# Patient Record
Sex: FEMALE | Race: BLACK OR AFRICAN AMERICAN | NOT HISPANIC OR LATINO | Employment: FULL TIME | URBAN - METROPOLITAN AREA
[De-identification: names, ages, dates, MRNs, and addresses within clinical notes are randomized per-mention and may not be internally consistent; named-entity substitution may affect disease eponyms.]

---

## 2018-03-07 ENCOUNTER — TELEPHONE (OUTPATIENT)
Dept: SURGERY | Facility: CLINIC | Age: 23
End: 2018-03-07

## 2018-03-07 NOTE — TELEPHONE ENCOUNTER
Return call to pt. Call went to voice mail. Left message for pt to call back to schedule an appt. Will await call back from pt.

## 2018-03-07 NOTE — TELEPHONE ENCOUNTER
----- Message from Gertrude Alvarez sent at 3/7/2018  3:09 PM CST -----  Regarding: External Referral Appt Request   Jovita Figueroa referring pt to Dr Enriquez for a consult dx family hx of breast cancer on mother's side. I cannot schedule her an appt. Would you please call the pt to assist her in scheduling? Jovita is sending us records that can be retrieved in . Thanks!

## 2018-04-04 ENCOUNTER — OFFICE VISIT (OUTPATIENT)
Dept: SURGERY | Facility: CLINIC | Age: 23
End: 2018-04-04
Payer: COMMERCIAL

## 2018-04-04 VITALS
TEMPERATURE: 98 F | BODY MASS INDEX: 33.64 KG/M2 | DIASTOLIC BLOOD PRESSURE: 86 MMHG | HEART RATE: 61 BPM | SYSTOLIC BLOOD PRESSURE: 133 MMHG | WEIGHT: 235 LBS | HEIGHT: 70 IN

## 2018-04-04 DIAGNOSIS — Z91.89 AT HIGH RISK FOR BREAST CANCER: Primary | ICD-10-CM

## 2018-04-04 PROCEDURE — 99999 PR PBB SHADOW E&M-EST. PATIENT-LVL III: CPT | Mod: PBBFAC,,, | Performed by: SURGERY

## 2018-04-04 PROCEDURE — 99204 OFFICE O/P NEW MOD 45 MIN: CPT | Mod: S$GLB,,, | Performed by: SURGERY

## 2018-04-04 RX ORDER — KETOCONAZOLE 20 MG/ML
SHAMPOO, SUSPENSION TOPICAL
Refills: 1 | COMMUNITY
Start: 2018-03-17 | End: 2020-05-16 | Stop reason: ALTCHOICE

## 2018-04-04 NOTE — LETTER
April 11, 2018      Jovita Figueroa NP  [address]  [place] [facility] [address]           [facility]  [address]  [facility] [address]  Phone: [phone]  Fax: [phone]          Patient: Letty Deluna   MR Number: [id number]   YOB: 1995   Date of Visit: 4/4/2018       Dear Jovita Figueroa:    Thank you for referring Letty Deluna to me for evaluation. Attached you will find relevant portions of my assessment and plan of care.    If you have questions, please do not hesitate to call me. I look forward to following Letty Deluna along with you.    Sincerely,    Heather Ireland MD      Enclosure  CC:  No Recipients    If you would like to receive this communication electronically, please contact [email or web address] or [phone] to request more information on [facility] access.    For providers and/or their staff who would like to refer a patient to [facility], please contact us through our one-stop-shop provider referral line, [facility], at [phone].    If you feel you have received this communication in error or would no longer like to receive these types of communications, please e-mail [email or web address]

## 2018-04-04 NOTE — PROGRESS NOTES
Breast Surgery  [facility]  Department of Surgery      REFERRING PROVIDER: Jovita Figueroa NP  [address] [address]    Chief Complaint: Consult (New Patient Positive Family Hx Breast Cancer)    Subjective:      Patient ID: Letty Deluna is a 22 y.o. female who presents as a referral for strong family history of breast cancer and other additional cancers. Patient is a healthy college student with no current complaints or medical conditions. Does not believe anyone in family has ever had genetic testing.     Patient does not routinely do self breast exams.  Patient does not have noted a change on breast exam.  Patient denies nipple discharge. Patient denies to previous breast biopsy. Patient denies a personal history of breast cancer.    Family History:  Mother- Breast cancer (stage 4 tx with chemotherapy)- Diagnosed in her early 40's. Diet at age 49.   Maternal Grandmother- Breast cancer-Diagnosed in her late 40s early 50s.  Maternal cousin - Breast cancer.  in her early 40s.  Maternal aunt #1- Breast cancer  in her 60s.  Maternal aunt #2 - Ovarian cancer. Diagnosed age 50+  Maternal aunt #3- Leukanemia. Diagnosed age 50+  Maternal uncle- Colon cancer. Diagnosed age 50+    GYN History:  Age of menarche was 13.  Patient is .    No past medical history on file.  No past surgical history on file.  No current outpatient prescriptions on file prior to visit.     No current facility-administered medications on file prior to visit.      Social History     Social History    Marital status: Single     Spouse name: N/A    Number of children: N/A    Years of education: N/A     Occupational History    student      Social History Main Topics    Smoking status: Never Smoker    Smokeless tobacco: Not on file    Alcohol use Yes    Drug use: No    Sexual activity: No     Other Topics Concern    Not on file     Social History Narrative    No narrative on file     No family history on  "file.     Review of Systems   Constitutional: Negative for activity change, chills, fatigue, fever and unexpected weight change.   Respiratory: Negative for chest tightness and shortness of breath.    Cardiovascular: Negative for chest pain and palpitations.   Gastrointestinal: Negative for abdominal distention, abdominal pain, nausea and vomiting.   Skin: Negative for color change, pallor and rash.   Hematological: Negative for adenopathy. Does not bruise/bleed easily.     Objective:   /86 (BP Location: Right arm, Patient Position: Sitting, BP Method: Medium (Automatic))   Pulse 61   Temp 98.1 °F (36.7 °C) (Oral)   Ht 5' 10" (1.778 m)   Wt 106.6 kg (235 lb)   LMP 03/28/2018   BMI 33.72 kg/m²     Physical Exam   Constitutional: She is oriented to person, place, and time. She appears well-developed and well-nourished.   HENT:   Head: Normocephalic and atraumatic.   Neck: Normal range of motion. Neck supple. No thyromegaly present.   Pulmonary/Chest: Effort normal. No respiratory distress. Right breast exhibits no inverted nipple, no mass, no nipple discharge, no skin change and no tenderness. Left breast exhibits no inverted nipple, no mass, no nipple discharge, no skin change and no tenderness.   Abdominal: Soft. She exhibits no distension.   Lymphadenopathy:     She has no cervical adenopathy.   Neurological: She is alert and oriented to person, place, and time.   Skin: Skin is warm and dry.       Radiology review:   No breast imaging  Assessment:       21 yo college student with strong family history of breast cancer.   Plan:     Discussed family history extensively.   We discussed risk models and risk reduction.  Patient is not ready for genetic testing although we discussed pros and cons of being tested at her age.  With the earliest diagnosis being after age 40, I recommended that she begin imaging at age 32.  We discussed self breast exam and abnormalities including masses, discharge, dimpling, " LAD.  We discussed healthy weight including healthy diet and regular exercise decreasing her risk of breast cancer.  ETOH in moderation was recommended if at all.  I instructed to let me know if she notices anything out of the ordinary.  I will continue to see her yearly for CBE. No imaging needed at this time    45 minutes were spent on this encounter, 35 of which was face to face counseling

## 2020-04-02 ENCOUNTER — NURSE TRIAGE (OUTPATIENT)
Dept: ADMINISTRATIVE | Facility: CLINIC | Age: 25
End: 2020-04-02

## 2020-04-02 NOTE — TELEPHONE ENCOUNTER
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  Patient states that she was treated for the flu last week.  Patient states that other night her temp was 102.8 has come down to 98.6 as of today.  Patient states that she is having increased shortness of breath and difficulty breathing with chest pain.  States she can't take a deep breath in.  Patient also reports having chest tightness.  Cough present since last Friday states she had blood in the mucous.  Advised patient to be seen in the ER for difficulty breathing. Patient verbalized understanding. Patient reports that she is able to drive herself.      Reason for Disposition   [1] Difficulty breathing occurs AND [2] within 14 days of COVID-19 EXPOSURE (Close Contact)    Additional Information   Negative: SEVERE difficulty breathing (e.g., struggling for each breath, speak in single words, bluish lips)   Negative: Sounds like a life-threatening emergency to the triager    Protocols used: CORONAVIRUS (COVID-19) EXPOSURE-A-

## 2020-05-16 ENCOUNTER — HOSPITAL ENCOUNTER (EMERGENCY)
Facility: HOSPITAL | Age: 25
Discharge: ANOTHER HOSPITAL | End: 2020-05-17
Attending: EMERGENCY MEDICINE
Payer: COMMERCIAL

## 2020-05-16 DIAGNOSIS — K59.00 CONSTIPATION: ICD-10-CM

## 2020-05-16 DIAGNOSIS — R10.9 ABDOMINAL PAIN, UNSPECIFIED ABDOMINAL LOCATION: ICD-10-CM

## 2020-05-16 DIAGNOSIS — N83.209 CYST OF OVARY, UNSPECIFIED LATERALITY: Primary | ICD-10-CM

## 2020-05-16 LAB
ALBUMIN SERPL-MCNC: 4.1 G/DL (ref 3.3–5.5)
ALBUMIN SERPL-MCNC: 4.2 G/DL (ref 3.3–5.5)
ALP SERPL-CCNC: 46 U/L (ref 42–141)
ALP SERPL-CCNC: 52 U/L (ref 42–141)
B-HCG UR QL: NEGATIVE
BILIRUB SERPL-MCNC: 0.5 MG/DL (ref 0.2–1.6)
BILIRUB SERPL-MCNC: 0.5 MG/DL (ref 0.2–1.6)
BILIRUBIN, POC UA: NEGATIVE
BLOOD, POC UA: ABNORMAL
BUN SERPL-MCNC: 5 MG/DL (ref 7–22)
CALCIUM SERPL-MCNC: 9.8 MG/DL (ref 8–10.3)
CHLORIDE SERPL-SCNC: 105 MMOL/L (ref 98–108)
CLARITY, POC UA: ABNORMAL
COLOR, POC UA: ABNORMAL
CREAT SERPL-MCNC: 1 MG/DL (ref 0.6–1.2)
CTP QC/QA: YES
GLUCOSE SERPL-MCNC: 104 MG/DL (ref 73–118)
GLUCOSE, POC UA: NEGATIVE
KETONES, POC UA: NEGATIVE
LEUKOCYTE EST, POC UA: NEGATIVE
NITRITE, POC UA: NEGATIVE
PH UR STRIP: 7 [PH]
POC ALT (SGPT): 17 U/L (ref 10–47)
POC ALT (SGPT): 18 U/L (ref 10–47)
POC AMYLASE: 64 U/L (ref 14–97)
POC AST (SGOT): 23 U/L (ref 11–38)
POC AST (SGOT): 26 U/L (ref 11–38)
POC GGT: 25 U/L (ref 5–65)
POC TCO2: 27 MMOL/L (ref 18–33)
POTASSIUM BLD-SCNC: 3.7 MMOL/L (ref 3.6–5.1)
PROTEIN, POC UA: NEGATIVE
PROTEIN, POC: 8.2 G/DL (ref 6.4–8.1)
PROTEIN, POC: 8.7 G/DL (ref 6.4–8.1)
SODIUM BLD-SCNC: 140 MMOL/L (ref 128–145)
SPECIFIC GRAVITY, POC UA: 1.01
UROBILINOGEN, POC UA: 0.2 E.U./DL

## 2020-05-16 PROCEDURE — 85025 COMPLETE CBC W/AUTO DIFF WBC: CPT | Mod: ER

## 2020-05-16 PROCEDURE — U0002 COVID-19 LAB TEST NON-CDC: HCPCS

## 2020-05-16 PROCEDURE — 81003 URINALYSIS AUTO W/O SCOPE: CPT | Mod: ER

## 2020-05-16 PROCEDURE — 81025 URINE PREGNANCY TEST: CPT | Mod: ER | Performed by: EMERGENCY MEDICINE

## 2020-05-16 PROCEDURE — 99285 EMERGENCY DEPT VISIT HI MDM: CPT | Mod: 25,ER

## 2020-05-16 PROCEDURE — 80053 COMPREHEN METABOLIC PANEL: CPT | Mod: ER

## 2020-05-16 PROCEDURE — 82150 ASSAY OF AMYLASE: CPT | Mod: ER

## 2020-05-16 RX ORDER — ONDANSETRON 4 MG/1
8 TABLET, FILM COATED ORAL 2 TIMES DAILY
COMMUNITY

## 2020-05-16 RX ORDER — SYRING-NEEDL,DISP,INSUL,0.3 ML 29 G X1/2"
296 SYRINGE, EMPTY DISPOSABLE MISCELLANEOUS ONCE
COMMUNITY

## 2020-05-17 VITALS
DIASTOLIC BLOOD PRESSURE: 97 MMHG | SYSTOLIC BLOOD PRESSURE: 141 MMHG | TEMPERATURE: 99 F | RESPIRATION RATE: 18 BRPM | BODY MASS INDEX: 32.93 KG/M2 | HEIGHT: 70 IN | HEART RATE: 70 BPM | OXYGEN SATURATION: 100 % | WEIGHT: 230 LBS

## 2020-05-17 LAB — SARS-COV-2 RDRP RESP QL NAA+PROBE: NEGATIVE

## 2020-05-17 PROCEDURE — 25000003 PHARM REV CODE 250: Mod: ER | Performed by: EMERGENCY MEDICINE

## 2020-05-17 RX ORDER — ACETAMINOPHEN 650 MG/1
650 SUPPOSITORY RECTAL
Status: COMPLETED | OUTPATIENT
Start: 2020-05-17 | End: 2020-05-17

## 2020-05-17 RX ADMIN — ACETAMINOPHEN 650 MG: 650 SUPPOSITORY RECTAL at 01:05

## 2020-05-17 NOTE — ED PROVIDER NOTES
Encounter Date: 5/16/2020       History     Chief Complaint   Patient presents with    Abdominal Pain     LLQ pain since 11am yesterday with vomiting; seen at [facility] urgent care yesterday, dx'd with constipation & given mag citrate, rx'd zofran; pt states pain/symptoms persist unchanged     25 yo woman, denies pmh, states currently on period, presents c/o LLQ pain since 11am yesterday, initially felt as though she may have been constipated. Went to urgent care yesterday who she states did no testing but prescribed laxatives, states that since then she had multiple bowel movements however her LLQ pain never resolved. Notes that the pain goes from tolerable to episodes of completely intolerable pain that has her doubled over. Pain then improves from these episodes but never resolves. She states that she is currently on her menstrual cycle but it is not her typical menstrual cycle pain, denies f/c, n/v, blood in stool, fever, nausea, recent illness. Denies vaginal discharge.        Review of patient's allergies indicates:  No Known Allergies  History reviewed. No pertinent past medical history.  History reviewed. No pertinent surgical history.  History reviewed. No pertinent family history.  Social History     Tobacco Use    Smoking status: Never Smoker    Smokeless tobacco: Never Used   Substance Use Topics    Alcohol use: Yes    Drug use: No     Review of Systems   Constitutional: Negative for fever.   HENT: Negative for sore throat.    Respiratory: Negative for shortness of breath.    Cardiovascular: Negative for chest pain.   Gastrointestinal: Positive for abdominal pain. Negative for nausea.   Genitourinary: Negative for dysuria.   Musculoskeletal: Negative for back pain.   Skin: Negative for rash.   Neurological: Negative for weakness.   Hematological: Does not bruise/bleed easily.   All other systems reviewed and are negative.      Physical Exam     Initial Vitals [05/16/20 2059]   BP Pulse Resp Temp SpO2    (!) 142/85 79 20 98.6 °F (37 °C) 100 %      MAP       --         Physical Exam    Nursing note and vitals reviewed.  Constitutional: She appears well-developed and well-nourished.   HENT:   Head: Normocephalic and atraumatic.   Eyes: Conjunctivae and EOM are normal. Pupils are equal, round, and reactive to light.   Neck: Normal range of motion.   Cardiovascular: Normal rate.   Pulmonary/Chest: No respiratory distress.   Abdominal: She exhibits no distension.   Musculoskeletal: Normal range of motion.   Neurological: She is alert. No cranial nerve deficit. GCS score is 15. GCS eye subscore is 4. GCS verbal subscore is 5. GCS motor subscore is 6.   Skin: Skin is warm and dry.   Psychiatric: She has a normal mood and affect. Thought content normal.     minimal LLQ ttp    ED Course   Procedures  Labs Reviewed   POCT URINALYSIS W/O SCOPE - Abnormal; Notable for the following components:       Result Value    Blood, UA 3+ (*)     All other components within normal limits   SARS-COV-2 RNA AMPLIFICATION, QUAL   POCT URINE PREGNANCY   POCT CBC   POCT URINALYSIS W/O SCOPE   POCT CMP   POCT LIVER PANEL          Imaging Results    None                               Plan: screening labs, ua, upt, pelvic us.     Labs Reviewed   POCT URINALYSIS W/O SCOPE - Abnormal; Notable for the following components:       Result Value    Blood, UA 3+ (*)     All other components within normal limits   POCT CMP - Abnormal; Notable for the following components:    POC BUN 5 (*)     Protein 8.7 (*)     All other components within normal limits   POCT LIVER PANEL - Abnormal; Notable for the following components:    Protein 8.2 (*)     All other components within normal limits   SARS-COV-2 RNA AMPLIFICATION, QUAL   POCT CBC   POCT URINE PREGNANCY   POCT URINALYSIS W/O SCOPE   POCT CMP   POCT LIVER PANEL       US Pelvis Complete Non OB   Final Result   Abnormal      Significantly large left ovarian cyst.  In a premenopausal female, follow-up in  2-6 months for resolution/hree characterisation or in 6-12 months for growth rate.      This report was flagged in Epic as abnormal.         Electronically signed by: Pippa Lan   Date:    05/16/2020   Time:    23:10      X-Ray Abdomen Flat And Erect   Final Result      Nonspecific bowel gas pattern.         Electronically signed by: Pippa Lan   Date:    05/16/2020   Time:    22:18          Pt informs me that she is followed by  Dr. Jovita Lau, my concern is that she may be intermittently torsing as the cause of her intermittent severe LLQ pain. The patient states that she last saw her doctor 1 month ago. Given that we are at a free-standing will transfer pt to [facility] so that she can she can have ob/gyn input.    Dr. Bishop has accepted pt to [facility].  Clinical Impression:       ICD-10-CM ICD-9-CM   1. Cyst of ovary, unspecified laterality N83.209 620.2   2. Constipation K59.00 564.00   3. Abdominal pain, unspecified abdominal location R10.9 789.00                                Peggy Tabares MD  05/17/20 0014       Peggy Tabares MD  05/17/20 0113     No

## 2020-05-18 ENCOUNTER — TELEPHONE (OUTPATIENT)
Dept: EMERGENCY MEDICINE | Facility: HOSPITAL | Age: 25
End: 2020-05-18

## 2021-04-16 ENCOUNTER — PATIENT MESSAGE (OUTPATIENT)
Dept: RESEARCH | Facility: HOSPITAL | Age: 26
End: 2021-04-16